# Patient Record
Sex: MALE
[De-identification: names, ages, dates, MRNs, and addresses within clinical notes are randomized per-mention and may not be internally consistent; named-entity substitution may affect disease eponyms.]

---

## 2020-11-17 PROBLEM — Z00.00 ENCOUNTER FOR PREVENTIVE HEALTH EXAMINATION: Status: ACTIVE | Noted: 2020-11-17

## 2020-11-24 ENCOUNTER — APPOINTMENT (OUTPATIENT)
Dept: OTOLARYNGOLOGY | Facility: CLINIC | Age: 30
End: 2020-11-24
Payer: COMMERCIAL

## 2020-11-24 VITALS
TEMPERATURE: 98.3 F | HEART RATE: 65 BPM | DIASTOLIC BLOOD PRESSURE: 78 MMHG | HEIGHT: 76 IN | WEIGHT: 206 LBS | SYSTOLIC BLOOD PRESSURE: 142 MMHG | BODY MASS INDEX: 25.09 KG/M2

## 2020-11-24 DIAGNOSIS — Z22.330 CARRIER OF GROUP B STREPTOCOCCUS: ICD-10-CM

## 2020-11-24 DIAGNOSIS — J98.9 RESPIRATORY DISORDER, UNSPECIFIED: ICD-10-CM

## 2020-11-24 PROCEDURE — 31231 NASAL ENDOSCOPY DX: CPT

## 2020-11-24 PROCEDURE — 99072 ADDL SUPL MATRL&STAF TM PHE: CPT

## 2020-11-24 PROCEDURE — 99204 OFFICE O/P NEW MOD 45 MIN: CPT | Mod: 25

## 2020-11-24 NOTE — PHYSICAL EXAM
[Nasal Endoscopy Performed] : nasal endoscopy was performed, see procedure section for findings [Midline] : trachea located in midline position [de-identified] : 2-3+, cryptic [de-identified] : mildly full oropharynx [Normal] : no rashes

## 2020-11-24 NOTE — ASSESSMENT
[FreeTextEntry1] : 30M here for initial evaluation. For as long as he can remember, he c/o right sided nasal obstruction where he is unable to really pass any air through his right nostril; left sided breathing, however, is good. There is no h/o sinusitis and there are no allergies. CT sinus shows severely rightward septal deviation with marked narrowing of the right nasal vestibule with bilateral nasal turbinate hypertrophy without measurable paranasal sinus disease.\par On exam, there is marked right septal deviation involving the right caudal septum w leftward curling inferiorly over the anterior nasal spine; there are other areas of right sided midseptal and superior septal deviation w marked right nasal cavity narrowing.\par His right nasal obstruction is due to obvious septal deviation and deformity. There is no paranasal sinus disease. The only way to improve and optimize nasal breathing is via septoplasty, which, given location and degree of deformity, will likely need to be done open. This was all discussed at length, including the surgical procedure. He would like to proceed  -plan for tentative OR in the next 2-3 months.

## 2020-11-24 NOTE — PROCEDURE
[FreeTextEntry3] : nasal tip and columellar deviation to left\par \par Nasal Endoscopy\par marked right septal deviation involving right caudal septum w curling inferiorly over anterior nasal spine\par further right sided midseptal and superior septal deviation w marked right nasal cavity narrowing\par no mucopus or polyps\par choana clear, no drainage

## 2020-11-24 NOTE — DATA REVIEWED
[de-identified] : CT Sinus 3/2019:\par FINDINGS:  \par \par Nasal Cavity:\par Bony septum severely deviates rightward. Cartilaginous septum better evaluated directly. Severely narrowed right nasal vestibule. Bilateral nasal turbinate hypertrophy, worse on the right. Mucosal thickening diffusely including the superior cavities. No discrete mass. Carmella jaimee, cribriform plate and tegmen ethmoidalis are intact.\par \par Frontal Sinuses/ Frontal Sinus Outflow:\par Well pneumatized. Mucosal thickening inferiorly on the right, left side is clear. Patent outflow tracts.\par \par Ethmoid Sinuses:\par Clear. Intact septa.\par \par Maxillary Sinuses and Ostiomeatal Units:\par Mucosal thickening superiorly, greater on the left. Mucosal thickening, large ethmoid bullae and hypertrophy of underlying normal structures results in significant narrowing of the ostiomeatal units, greater on the right.\par \par Sphenoid Sinuses/ Outflow:\par Well pneumatized extending into the anterior clinoids and pterygoid processes. Intersinus septum attaches between the carotid canals. A partial septum inserts on the right carotid canal. Bilaterally clear. Patent sphenoethmoidal recesses.\par \par Nonrelated Findings:\par Clear tympanomastoid cavities. No orbital or ocular mass. Left orbital floor bony deformity which includes the infraorbital foramen, appearing ptotic, however remaining covered. Prominent soft tissues midline nasopharynx without deep invasion, mild/moderate airway narrowing. Limited view, palatine tonsillar calcifications, sequela of prior inflammation/infection.\par \par IMPRESSION:  \par 1. Bony septum severely deviates rightward, cartilaginous septum better evaluated directly. Severely narrowed right nasal vestibule. Bilateral nasal turbinate hypertrophy and mucosal thickening, worse on the right. Severe rhinitis.\par 2. Well pneumatized paranasal sinuses. Mild compression deformity left orbital floor including the infraorbital foramen, ptotic, and remains covered.\par 3. Abnormal mucosal thickening, presumed inflammatory, superior maxillary sinuses and to a lesser degree inferior right frontal. Narrowed ostiomeatal units, severe right-sided.\par 4. Correlate adenoidal hypertrophy, mild/moderate airway narrowing.

## 2020-11-24 NOTE — HISTORY OF PRESENT ILLNESS
[de-identified] : 30M here for initial evaluation.\par \par For as long as he can remember, he c/o right sided nasal obstruction where he is unable to really pass any air through his right nostril. Left sided breathing is good. There is no h/o sinus issues - no green mucus, no postnasal drip, no foul nasal odor and no h/o sinusitis.\par He is mouth breather.\par \par CT Sinus 3/2019 (I reviewed images):\par -Bony septum severely deviates rightward; severely narrowed right nasal vestibule\par -Bilateral nasal turbinate hypertrophy and mucosal thickening, worse on the right. Severe rhinitis.\par -Well pneumatized paranasal sinuses, minimal, if any mucosal disease\par \par ROS otherwise unremarkable

## 2020-11-24 NOTE — CONSULT LETTER
[Dear  ___] : Dear  [unfilled], [Courtesy Letter:] : I had the pleasure of seeing your patient, [unfilled], in my office today. [Consult Closing:] : Thank you very much for allowing me to participate in the care of this patient.  If you have any questions, please do not hesitate to contact me. [Sincerely,] : Sincerely, [FreeTextEntry3] : Edinson Thayer MD\par Department of Otolaryngology - Head and Neck Surgery\par [facility]

## 2020-12-03 ENCOUNTER — APPOINTMENT (OUTPATIENT)
Dept: PLASTIC SURGERY | Facility: CLINIC | Age: 30
End: 2020-12-03
Payer: COMMERCIAL

## 2020-12-03 DIAGNOSIS — F17.210 NICOTINE DEPENDENCE, CIGARETTES, UNCOMPLICATED: ICD-10-CM

## 2020-12-03 DIAGNOSIS — M95.0 ACQUIRED DEFORMITY OF NOSE: ICD-10-CM

## 2020-12-03 DIAGNOSIS — J32.9 CHRONIC SINUSITIS, UNSPECIFIED: ICD-10-CM

## 2020-12-03 DIAGNOSIS — J34.89 OTHER SPECIFIED DISORDERS OF NOSE AND NASAL SINUSES: ICD-10-CM

## 2020-12-03 DIAGNOSIS — J34.2 DEVIATED NASAL SEPTUM: ICD-10-CM

## 2020-12-03 PROCEDURE — 99202 OFFICE O/P NEW SF 15 MIN: CPT | Mod: 95

## 2021-01-03 NOTE — ASSESSMENT
[FreeTextEntry1] : Plan for septoplasty, extension  grafts for internal valve collapse, internal valve repair.\par Will require cotinine test?\par \par I will coordinate with Dr. Euceda for surgery in the near future.

## 2021-01-03 NOTE — HISTORY OF PRESENT ILLNESS
[Home] : at home, [unfilled] , at the time of the visit. [Medical Office: ([facility])___] : at the medical office located in  [Verbal consent obtained from patient] : the patient, [unfilled] [Spouse] : spouse [FreeTextEntry1] : 30M here for initial evaluation referred by Dr. Euceda. For as long as he can remember, he c/o right sided nasal obstruction where he is unable to really pass any air through his right nostril. Left sided breathing is good. There is no h/o sinus issues - no green mucus, no postnasal drip, no foul nasal odor and no h/o sinusitis.\par He is mouth breather. Current smoker.\par \par CT Sinus 3/2019 (I reviewed images):\par -Bony septum severely deviates rightward; severely narrowed right nasal vestibule\par -Bilateral nasal turbinate hypertrophy and mucosal thickening, worse on the right. Severe rhinitis.\par -Well pneumatized paranasal sinuses, minimal, if any mucosal disease

## 2021-01-03 NOTE — PHYSICAL EXAM
[de-identified] : right septal deviation involving right caudal septum, nasal valve collapse, ptotic nasal tip. right sided midseptal and superior septal deviation with right nasal cavity narrowing\par